# Patient Record
Sex: FEMALE | Race: WHITE | NOT HISPANIC OR LATINO | URBAN - METROPOLITAN AREA
[De-identification: names, ages, dates, MRNs, and addresses within clinical notes are randomized per-mention and may not be internally consistent; named-entity substitution may affect disease eponyms.]

---

## 2021-06-22 ENCOUNTER — OUT OF OFFICE VISIT (OUTPATIENT)
Dept: URBAN - METROPOLITAN AREA MEDICAL CENTER 16 | Facility: MEDICAL CENTER | Age: 86
End: 2021-06-22
Payer: MEDICARE

## 2021-06-22 DIAGNOSIS — Z87.19 H/O ACUTE PANCREATITIS: ICD-10-CM

## 2021-06-22 DIAGNOSIS — R13.19 CERVICAL DYSPHAGIA: ICD-10-CM

## 2021-06-22 DIAGNOSIS — K22.4 ESOPHAGEAL MOTILITY DISORDER: ICD-10-CM

## 2021-06-22 DIAGNOSIS — K22.2 ACQUIRED ESOPHAGEAL RING: ICD-10-CM

## 2021-06-22 DIAGNOSIS — K31.89 ACQUIRED DEFORMITY OF DUODENUM: ICD-10-CM

## 2021-06-22 PROCEDURE — 43249 ESOPH EGD DILATION <30 MM: CPT | Performed by: INTERNAL MEDICINE

## 2021-06-22 PROCEDURE — 99222 1ST HOSP IP/OBS MODERATE 55: CPT | Performed by: INTERNAL MEDICINE

## 2021-06-22 PROCEDURE — 43239 EGD BIOPSY SINGLE/MULTIPLE: CPT | Performed by: INTERNAL MEDICINE

## 2021-06-22 PROCEDURE — G8427 DOCREV CUR MEDS BY ELIG CLIN: HCPCS | Performed by: INTERNAL MEDICINE

## 2021-06-22 PROCEDURE — 99232 SBSQ HOSP IP/OBS MODERATE 35: CPT | Performed by: INTERNAL MEDICINE

## 2021-10-28 ENCOUNTER — OFFICE VISIT (OUTPATIENT)
Dept: URBAN - METROPOLITAN AREA CLINIC 118 | Facility: CLINIC | Age: 86
End: 2021-10-28
Payer: MEDICARE

## 2021-10-28 ENCOUNTER — LAB OUTSIDE AN ENCOUNTER (OUTPATIENT)
Dept: URBAN - METROPOLITAN AREA CLINIC 118 | Facility: CLINIC | Age: 86
End: 2021-10-28

## 2021-10-28 DIAGNOSIS — R29.818 FOCAL NEUROLOGICAL DEFICIT: ICD-10-CM

## 2021-10-28 DIAGNOSIS — R13.12 OROPHARYNGEAL DYSPHAGIA: ICD-10-CM

## 2021-10-28 DIAGNOSIS — K21.00 GASTROESOPHAGEAL REFLUX DISEASE WITH ESOPHAGITIS WITHOUT HEMORRHAGE: ICD-10-CM

## 2021-10-28 PROCEDURE — 99214 OFFICE O/P EST MOD 30 MIN: CPT | Performed by: INTERNAL MEDICINE

## 2021-10-28 RX ORDER — FAMOTIDINE 10 MG/1
1 TABLET AS NEEDED TABLET, FILM COATED ORAL TWICE A DAY
Status: ACTIVE | COMMUNITY

## 2021-10-28 RX ORDER — EZETIMIBE AND SIMVASTATIN 10; 40 MG/1; MG/1
TABLET ORAL
Qty: 30 | Status: ACTIVE | COMMUNITY

## 2021-10-28 RX ORDER — FAMOTIDINE 20 MG/1
TAKE 1 TABLET BY MOUTH TWICE DAILY TABLET, FILM COATED ORAL
Qty: 60 | Refills: 0 | Status: ACTIVE | COMMUNITY

## 2021-10-28 RX ORDER — ATENOLOL AND CHLORTHALIDONE 100; 25 MG/1; MG/1
TAKE 1 TABLET BY MOUTH EVERY DAY DIAGNOSIS UNAVAILABLE TABLET ORAL
Qty: 90 | Refills: 0 | Status: ACTIVE | COMMUNITY

## 2021-10-28 RX ORDER — RISEDRONATE SODIUM 150 MG/1
TAKE 1 TABLET BY MOUTH 1 TIME A MONTH TABLET, FILM COATED ORAL
Qty: 3 | Refills: 0 | Status: ACTIVE | COMMUNITY

## 2021-10-28 RX ORDER — AMLODIPINE AND BENAZEPRIL HYDROCHLORIDE 5; 20 MG/1; MG/1
CAPSULE ORAL
Qty: 30 | Status: ACTIVE | COMMUNITY

## 2021-10-28 NOTE — HPI-TODAY'S VISIT:
The patient is following up after her recent hospitalization for dysphagia in June.  At that time she underwent an endoscopy by Dr. Godfrey and esophageal stenosis was noted at the top of a small hiatal hernia, dilated to 20 mm.  This is also been dilated in 2016.  However the patient also complains of significant oropharyngeal dysphagia with intermittent coughing with food.  She was evaluated by speech therapy both clinically and with a modified barium swallow.  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  There was no history of neuromuscular disease or stroke.  She denies any other focal weakness.  She has no dysphonia, hoarseness, or change in her voice.  She has had no aspiration event nor recent pneumonia.

## 2021-10-29 ENCOUNTER — TELEPHONE ENCOUNTER (OUTPATIENT)
Dept: URBAN - METROPOLITAN AREA CLINIC 92 | Facility: CLINIC | Age: 86
End: 2021-10-29

## 2021-10-29 PROBLEM — 266433003: Status: ACTIVE | Noted: 2021-10-29

## 2022-05-05 ENCOUNTER — OFFICE VISIT (OUTPATIENT)
Dept: URBAN - METROPOLITAN AREA CLINIC 118 | Facility: CLINIC | Age: 87
End: 2022-05-05
Payer: MEDICARE

## 2022-05-05 DIAGNOSIS — R13.12 OROPHARYNGEAL DYSPHAGIA: ICD-10-CM

## 2022-05-05 PROCEDURE — 99213 OFFICE O/P EST LOW 20 MIN: CPT | Performed by: INTERNAL MEDICINE

## 2022-05-05 RX ORDER — EZETIMIBE AND SIMVASTATIN 10; 40 MG/1; MG/1
TABLET ORAL
Qty: 30 | Status: ACTIVE | COMMUNITY

## 2022-05-05 RX ORDER — RISEDRONATE SODIUM 150 MG/1
TAKE 1 TABLET BY MOUTH 1 TIME A MONTH TABLET, FILM COATED ORAL
Qty: 3 | Refills: 0 | Status: ACTIVE | COMMUNITY

## 2022-05-05 RX ORDER — ATENOLOL AND CHLORTHALIDONE 100; 25 MG/1; MG/1
TAKE 1 TABLET BY MOUTH EVERY DAY DIAGNOSIS UNAVAILABLE TABLET ORAL
Qty: 90 | Refills: 0 | Status: ACTIVE | COMMUNITY

## 2022-05-05 RX ORDER — FAMOTIDINE 10 MG/1
1 TABLET AS NEEDED TABLET, FILM COATED ORAL TWICE A DAY
Status: ACTIVE | COMMUNITY

## 2022-05-05 RX ORDER — AMLODIPINE AND BENAZEPRIL HYDROCHLORIDE 5; 20 MG/1; MG/1
CAPSULE ORAL
Qty: 30 | Status: ACTIVE | COMMUNITY

## 2022-05-05 RX ORDER — FAMOTIDINE 20 MG/1
TAKE 1 TABLET BY MOUTH TWICE DAILY TABLET, FILM COATED ORAL
Qty: 60 | Refills: 0 | Status: ACTIVE | COMMUNITY

## 2022-05-05 NOTE — HPI-TODAY'S VISIT:
The patient is following up after her recent MRI of her head.  Her oropharyngeal dysphagia is stable.  There is still a problem with large pills or boluses of meat, but she has no weight loss, odynophagia, hematemesis, melena, recent aspiration pneumonia, or significant chest pain.  She does have mild hoarseness and substernal burning.  She is on famotidine 2 tablets daily.  She does not think the symptoms have been progressive since her last visit.

## 2022-11-03 ENCOUNTER — OFFICE VISIT (OUTPATIENT)
Dept: URBAN - METROPOLITAN AREA CLINIC 118 | Facility: CLINIC | Age: 87
End: 2022-11-03

## 2022-11-03 ENCOUNTER — CLAIMS CREATED FROM THE CLAIM WINDOW (OUTPATIENT)
Dept: URBAN - METROPOLITAN AREA CLINIC 118 | Facility: CLINIC | Age: 87
End: 2022-11-03
Payer: MEDICARE

## 2022-11-03 ENCOUNTER — CLAIMS CREATED FROM THE CLAIM WINDOW (OUTPATIENT)
Dept: URBAN - METROPOLITAN AREA CLINIC 118 | Facility: CLINIC | Age: 87
End: 2022-11-03

## 2022-11-03 VITALS
BODY MASS INDEX: 22.25 KG/M2 | HEIGHT: 63 IN | HEART RATE: 56 BPM | DIASTOLIC BLOOD PRESSURE: 74 MMHG | WEIGHT: 125.6 LBS | TEMPERATURE: 97.7 F | SYSTOLIC BLOOD PRESSURE: 183 MMHG

## 2022-11-03 DIAGNOSIS — R13.12 OROPHARYNGEAL DYSPHAGIA: ICD-10-CM

## 2022-11-03 PROCEDURE — 99212 OFFICE O/P EST SF 10 MIN: CPT | Performed by: INTERNAL MEDICINE

## 2022-11-03 RX ORDER — AMLODIPINE AND BENAZEPRIL HYDROCHLORIDE 5; 20 MG/1; MG/1
CAPSULE ORAL
Qty: 30 | Status: ACTIVE | COMMUNITY

## 2022-11-03 RX ORDER — FAMOTIDINE 10 MG/1
1 TABLET AS NEEDED TABLET, FILM COATED ORAL TWICE A DAY
Status: ACTIVE | COMMUNITY

## 2022-11-03 RX ORDER — ATENOLOL AND CHLORTHALIDONE 100; 25 MG/1; MG/1
TAKE 1 TABLET BY MOUTH EVERY DAY DIAGNOSIS UNAVAILABLE TABLET ORAL
Qty: 90 | Refills: 0 | Status: ACTIVE | COMMUNITY

## 2022-11-03 RX ORDER — FAMOTIDINE 20 MG/1
TAKE 1 TABLET BY MOUTH TWICE DAILY TABLET, FILM COATED ORAL
Qty: 60 | Refills: 0 | Status: ACTIVE | COMMUNITY

## 2022-11-03 RX ORDER — RISEDRONATE SODIUM 150 MG/1
TAKE 1 TABLET BY MOUTH 1 TIME A MONTH TABLET, FILM COATED ORAL
Qty: 3 | Refills: 0 | Status: ACTIVE | COMMUNITY

## 2022-11-03 RX ORDER — EZETIMIBE AND SIMVASTATIN 10; 40 MG/1; MG/1
TABLET ORAL
Qty: 30 | Status: ACTIVE | COMMUNITY

## 2022-11-03 NOTE — HPI-TODAY'S VISIT:
The patient is following up after an office visit in May.  She has not been hospitalized nor had to go to the emergency room.  Her swallowing difficulties are somewhat improved.  Her primary is problems with pills although she will have a problem with meat if she takes too large of a bite.  There has been no food impaction and her weight is up 2 pounds.  She does chew her food well and is aware of swallowing liquids with meals.  She has no hematemesis or vomiting, and there is no abdominal pain or significant nausea.

## 2022-11-06 ENCOUNTER — DASHBOARD ENCOUNTERS (OUTPATIENT)
Age: 87
End: 2022-11-06

## 2022-11-06 PROBLEM — 71457002: Status: ACTIVE | Noted: 2021-10-28
